# Patient Record
Sex: FEMALE | Race: AMERICAN INDIAN OR ALASKA NATIVE
[De-identification: names, ages, dates, MRNs, and addresses within clinical notes are randomized per-mention and may not be internally consistent; named-entity substitution may affect disease eponyms.]

---

## 2019-03-23 ENCOUNTER — HOSPITAL ENCOUNTER (EMERGENCY)
Dept: HOSPITAL 5 - ED | Age: 24
Discharge: HOME | End: 2019-03-23
Payer: SELF-PAY

## 2019-03-23 VITALS — DIASTOLIC BLOOD PRESSURE: 83 MMHG | SYSTOLIC BLOOD PRESSURE: 121 MMHG

## 2019-03-23 DIAGNOSIS — N76.0: Primary | ICD-10-CM

## 2019-03-23 LAB
BACTERIA #/AREA URNS HPF: (no result) /HPF
BILIRUB UR QL STRIP: (no result)
BLOOD UR QL VISUAL: (no result)
MUCOUS THREADS #/AREA URNS HPF: (no result) /HPF
PH UR STRIP: 6 [PH] (ref 5–7)
PROT UR STRIP-MCNC: (no result) MG/DL
RBC #/AREA URNS HPF: 2 /HPF (ref 0–6)
UROBILINOGEN UR-MCNC: 4 MG/DL (ref ?–2)
WBC #/AREA URNS HPF: 4 /HPF (ref 0–6)

## 2019-03-23 PROCEDURE — 96372 THER/PROPH/DIAG INJ SC/IM: CPT

## 2019-03-23 PROCEDURE — 99283 EMERGENCY DEPT VISIT LOW MDM: CPT

## 2019-03-23 PROCEDURE — 81001 URINALYSIS AUTO W/SCOPE: CPT

## 2019-03-23 PROCEDURE — 81025 URINE PREGNANCY TEST: CPT

## 2019-03-23 NOTE — EMERGENCY DEPARTMENT REPORT
ED Abdominal Pain HPI





- General


Chief Complaint: Abdominal Pain


Stated Complaint: STOMACH PAIN/CRAMPS


Time Seen by Provider: 03/23/19 11:05


Source: patient


Mode of arrival: Ambulatory


Limitations: No Limitations





- History of Present Illness


Initial Comments: 





Patient is a 23-year-old  female who is complaining of some lower 

abdominal crampiness.  Patient states she also has some associated vaginal 

irritation but denies dysuria or vaginal discharge.  Patient is worried about 

trichomonas in particular.  She is sexually active and has had this in the past 

with similar symptoms.


Severity scale (0 -10): 5


Associated Symptoms: denies: nausea, vomiting, diarrhea, fever, chills, 

constipation, dysuria, hematemesis, hematochezia, melena, hematuria, anorexia





- Related Data


                                Home Medications











 Medication  Instructions  Recorded  Confirmed  Last Taken


 


No Known Home Medications [No  09/06/13 09/06/13 Unknown





Reported Home Medications]    











                                    Allergies











Allergy/AdvReac Type Severity Reaction Status Date / Time


 


No Known Allergies Allergy   Verified 03/23/19 10:27














ED Review of Systems


ROS: 


Stated complaint: STOMACH PAIN/CRAMPS


Other details as noted in HPI





Comment: All other systems reviewed and negative





ED Past Medical Hx





- Past Medical History


Previous Medical History?: Yes


Additional medical history: NO PMH





- Surgical History


Past Surgical History?: No





- Social History


Smoking Status: Never Smoker


Substance Use Type: None





- Medications


Home Medications: 


                                Home Medications











 Medication  Instructions  Recorded  Confirmed  Last Taken  Type


 


No Known Home Medications [No  09/06/13 09/06/13 Unknown History





Reported Home Medications]     














ED Physical Exam





- General


Limitations: No Limitations


General appearance: alert, in no apparent distress





- Head


Head exam: Present: atraumatic, normocephalic





- Eye


Eye exam: Present: normal appearance





- ENT


ENT exam: Present: mucous membranes moist





- Neck


Neck exam: Present: normal inspection





- Respiratory


Respiratory exam: Present: normal lung sounds bilaterally.  Absent: respiratory 

distress, wheezes, rales, rhonchi





- Cardiovascular


Cardiovascular Exam: Present: regular rate, normal rhythm.  Absent: systolic 

murmur, diastolic murmur, rubs, gallop





- GI/Abdominal


GI/Abdominal exam: Present: soft, normal bowel sounds.  Absent: distended, 

tenderness, guarding, rebound, rigid





- Extremities Exam


Extremities exam: Present: normal inspection





- Back Exam


Back exam: Present: normal inspection





- Neurological Exam


Neurological exam: Present: alert, oriented X3





- Psychiatric


Psychiatric exam: Present: normal affect, normal mood





- Skin


Skin exam: Present: warm, dry, intact, normal color.  Absent: rash





ED Course





                                   Vital Signs











  03/23/19





  10:33


 


Temperature 98.1 F


 


Pulse Rate 93 H


 


Respiratory 16





Rate 


 


Blood Pressure 121/83





[Left] 


 


O2 Sat by Pulse 98





Oximetry 














ED Medical Decision Making





- Lab Data








                                   Lab Results











  03/23/19 Range/Units





  10:44 


 


Urine Color  Yellow  (Yellow)  


 


Urine Turbidity  Slightly-cloudy  (Clear)  


 


Urine pH  6.0  (5.0-7.0)  


 


Ur Specific Gravity  1.021  (1.003-1.030)  


 


Urine Protein  <15 mg/dl  (Negative)  mg/dL


 


Urine Glucose (UA)  Neg  (Negative)  mg/dL


 


Urine Ketones  Tr  (Negative)  mg/dL


 


Urine Blood  Neg  (Negative)  


 


Urine Nitrite  Neg  (Negative)  


 


Urine Bilirubin  Neg  (Negative)  


 


Urine Urobilinogen  4.0  (<2.0)  mg/dL


 


Ur Leukocyte Esterase  Sm  (Negative)  


 


Urine WBC (Auto)  4.0  (0.0-6.0)  /HPF


 


Urine RBC (Auto)  2.0  (0.0-6.0)  /HPF


 


U Epithel Cells (Auto)  5.0  (0-13.0)  /HPF


 


Urine Bacteria (Auto)  1+  (Negative)  /HPF


 


Urine Mucus  3+  /HPF


 


Urine HCG, Qual  Negative  (Negative)  














- Medical Decision Making





Patient to receive empiric STD prophylaxis.  Urine is sent for gonorrhea 

Chlamydia culture.  Patient discharged home.


Critical care attestation.: 


If time is entered above; I have spent that time in minutes in the direct care 

of this critically ill patient, excluding procedure time.








ED Disposition


Clinical Impression: 


Vaginitis


Qualifiers:


 Chronicity: acute Qualified Code(s): N76.0 - Acute vaginitis





Disposition: DC-01 TO HOME OR SELFCARE


Is pt being admited?: No


Does the pt Need Aspirin: No


Condition: Stable


Instructions:  Vaginitis (ED)


Referrals: 


[facility], [place] [Primary Care Provider] - 3-5 Days


Time of Disposition: 11:30

## 2019-04-13 ENCOUNTER — HOSPITAL ENCOUNTER (EMERGENCY)
Dept: HOSPITAL 5 - ED | Age: 24
Discharge: ANOTHER HOSPITAL | End: 2019-04-13
Payer: COMMERCIAL

## 2019-04-13 VITALS — DIASTOLIC BLOOD PRESSURE: 86 MMHG | SYSTOLIC BLOOD PRESSURE: 127 MMHG
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
## 2019-04-13 DIAGNOSIS — S62.311A: Primary | ICD-10-CM

## 2019-04-13 DIAGNOSIS — S62.102A: ICD-10-CM
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
## 2019-04-13 DIAGNOSIS — S62.313A: ICD-10-CM

## 2019-04-13 DIAGNOSIS — R51: ICD-10-CM

## 2019-04-13 DIAGNOSIS — S62.315A: ICD-10-CM
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
## 2019-04-13 LAB
ALBUMIN SERPL-MCNC: 4.7 G/DL (ref 3.9–5)
ALT SERPL-CCNC: 21 UNITS/L (ref 7–56)
BAND NEUTROPHILS # (MANUAL): 0 K/MM3
BUN SERPL-MCNC: 8 MG/DL (ref 7–17)
BUN/CREAT SERPL: 13 %
CALCIUM SERPL-MCNC: 9.6 MG/DL (ref 8.4–10.2)
HCT VFR BLD CALC: 38.2 % (ref 30.3–42.9)
HEMOLYSIS INDEX: 9
HGB BLD-MCNC: 13.2 GM/DL (ref 10.1–14.3)
MCHC RBC AUTO-ENTMCNC: 35 % (ref 30–34)
MCV RBC AUTO: 95 FL (ref 79–97)
MYELOCYTES # (MANUAL): 0 K/MM3
PLATELET # BLD: 310 K/MM3 (ref 140–440)
PROMYELOCYTES # (MANUAL): 0 K/MM3
RBC # BLD AUTO: 4.02 M/MM3 (ref 3.65–5.03)
TOTAL CELLS COUNTED BLD: 100

## 2019-04-13 PROCEDURE — 96376 TX/PRO/DX INJ SAME DRUG ADON: CPT

## 2019-04-13 PROCEDURE — 80053 COMPREHEN METABOLIC PANEL: CPT

## 2019-04-13 PROCEDURE — 70450 CT HEAD/BRAIN W/O DYE: CPT

## 2019-04-13 PROCEDURE — 96374 THER/PROPH/DIAG INJ IV PUSH: CPT

## 2019-04-13 PROCEDURE — 72125 CT NECK SPINE W/O DYE: CPT

## 2019-04-13 PROCEDURE — 72170 X-RAY EXAM OF PELVIS: CPT

## 2019-04-13 PROCEDURE — 71260 CT THORAX DX C+: CPT

## 2019-04-13 PROCEDURE — 99285 EMERGENCY DEPT VISIT HI MDM: CPT

## 2019-04-13 PROCEDURE — 80320 DRUG SCREEN QUANTALCOHOLS: CPT

## 2019-04-13 PROCEDURE — 96361 HYDRATE IV INFUSION ADD-ON: CPT

## 2019-04-13 PROCEDURE — G0480 DRUG TEST DEF 1-7 CLASSES: HCPCS

## 2019-04-13 PROCEDURE — 36415 COLL VENOUS BLD VENIPUNCTURE: CPT

## 2019-04-13 PROCEDURE — 29105 APPLICATION LONG ARM SPLINT: CPT

## 2019-04-13 PROCEDURE — 71045 X-RAY EXAM CHEST 1 VIEW: CPT

## 2019-04-13 PROCEDURE — 74177 CT ABD & PELVIS W/CONTRAST: CPT

## 2019-04-13 PROCEDURE — 73120 X-RAY EXAM OF HAND: CPT

## 2019-04-13 PROCEDURE — 85025 COMPLETE CBC W/AUTO DIFF WBC: CPT

## 2019-04-13 PROCEDURE — 73090 X-RAY EXAM OF FOREARM: CPT

## 2019-04-13 PROCEDURE — 85007 BL SMEAR W/DIFF WBC COUNT: CPT

## 2019-04-13 PROCEDURE — 96375 TX/PRO/DX INJ NEW DRUG ADDON: CPT
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
